# Patient Record
Sex: FEMALE | Race: WHITE
[De-identification: names, ages, dates, MRNs, and addresses within clinical notes are randomized per-mention and may not be internally consistent; named-entity substitution may affect disease eponyms.]

---

## 2019-07-31 ENCOUNTER — HOSPITAL ENCOUNTER (OUTPATIENT)
Dept: HOSPITAL 88 - CT | Age: 69
End: 2019-07-31
Attending: FAMILY MEDICINE
Payer: MEDICARE

## 2019-07-31 DIAGNOSIS — R10.32: Primary | ICD-10-CM

## 2019-07-31 DIAGNOSIS — K57.90: ICD-10-CM

## 2019-07-31 PROCEDURE — 74176 CT ABD & PELVIS W/O CONTRAST: CPT

## 2019-07-31 NOTE — DIAGNOSTIC IMAGING REPORT
EXAM: CT Abdomen and Pelvis WITHOUT intravenous contrast  



INDICATION: Abdominal pain, concern for renal calculi or diverticulitis.



COMPARISON: None.



TECHNIQUE: Abdomen and pelvis were scanned utilizing a multidetector helical

scanner from the lung base to the pubic symphysis without administration of IV

contrast. Coronal and sagittal reformations were obtained. 

     

IV CONTRAST: None

ORAL CONTRAST: None

            

COMPLICATIONS: None



RADIATION DOSE:

Total DLP:  843.9 mGy*cm



Dose modulation, iterative reconstruction, and/or weight based adjustment of

the mA/kV was utilized to reduce the radiation dose to as low as reasonably

achievable. 



FINDINGS:

LOWER THORAX: No focal consolidation. Scattered subcentimeter calcified

granulomas. Bilateral lower lobe predominant subpleural reticulation and

subpleural cystic changes consistent with interstitial fibrosis. The heart is

not enlarged. No pericardial effusion.



HEPATOBILIARY: Hypoattenuation of liver parenchyma consistent with hepatic

steatosis. No focal liver lesions. Status post cholecystectomy.



SPLEEN: Several calcified granulomas in the nonenlarged spleen.



PANCREAS: No focal masses or ductal dilatation.



ADRENALS: No adrenal nodules.

KIDNEYS/URETERS: No hydronephrosis, stones, or solid mass lesions.

PELVIC ORGANS/BLADDER: Obscured by beam hardening artifact related to bilateral

total hip replacement.



PERITONEUM / RETROPERITONEUM: No free air or fluid.

LYMPH NODES: No lymphadenopathy.

VESSELS: Scattered atherosclerotic calcifications of the nonaneurysmal

abdominal aorta and major branches.



GI TRACT: There is distal esophageal wall thickening with internal

calcifications and a small hiatal hernia.



BONES AND SOFT TISSUES: Mild colonic diverticulosis with no CT evidence of

diverticulitis. No bowel obstruction. No abnormal bowel wall thickening. Normal

appendix.



IMPRESSION: 

No renal calculi. Diverticulosis with no CT evidence of diverticulitis.



Distal esophageal wall thickening with internal calcifications and small hiatal

hernia. Recommend further evaluation with endoscopy.



Hepatic steatosis.



Findings of mild interstitial fibrosis at the lung bases.





Signed by: Clarissa Cervantes MD on 7/31/2019 5:41 PM